# Patient Record
Sex: MALE | Race: BLACK OR AFRICAN AMERICAN | Employment: PART TIME | URBAN - METROPOLITAN AREA
[De-identification: names, ages, dates, MRNs, and addresses within clinical notes are randomized per-mention and may not be internally consistent; named-entity substitution may affect disease eponyms.]

---

## 2017-05-04 ENCOUNTER — ALLSCRIPTS OFFICE VISIT (OUTPATIENT)
Dept: OTHER | Facility: OTHER | Age: 22
End: 2017-05-04

## 2017-06-06 ENCOUNTER — ALLSCRIPTS OFFICE VISIT (OUTPATIENT)
Dept: OTHER | Facility: OTHER | Age: 22
End: 2017-06-06

## 2017-06-06 ENCOUNTER — TRANSCRIBE ORDERS (OUTPATIENT)
Dept: LAB | Facility: CLINIC | Age: 22
End: 2017-06-06

## 2017-06-06 ENCOUNTER — APPOINTMENT (OUTPATIENT)
Dept: LAB | Facility: CLINIC | Age: 22
End: 2017-06-06
Payer: COMMERCIAL

## 2017-06-06 DIAGNOSIS — R63.4 ABNORMAL WEIGHT LOSS: ICD-10-CM

## 2017-06-06 DIAGNOSIS — Z00.00 ENCOUNTER FOR GENERAL ADULT MEDICAL EXAMINATION WITHOUT ABNORMAL FINDINGS: ICD-10-CM

## 2017-06-06 LAB
ALBUMIN SERPL BCP-MCNC: 3.9 G/DL (ref 3.5–5)
ALP SERPL-CCNC: 87 U/L (ref 46–116)
ALT SERPL W P-5'-P-CCNC: 28 U/L (ref 12–78)
ANION GAP SERPL CALCULATED.3IONS-SCNC: 7 MMOL/L (ref 4–13)
AST SERPL W P-5'-P-CCNC: 25 U/L (ref 5–45)
BASOPHILS # BLD AUTO: 0.05 THOUSANDS/ΜL (ref 0–0.1)
BASOPHILS NFR BLD AUTO: 1 % (ref 0–1)
BILIRUB SERPL-MCNC: 0.67 MG/DL (ref 0.2–1)
BUN SERPL-MCNC: 12 MG/DL (ref 5–25)
CALCIUM SERPL-MCNC: 9.6 MG/DL (ref 8.3–10.1)
CHLORIDE SERPL-SCNC: 99 MMOL/L (ref 100–108)
CO2 SERPL-SCNC: 30 MMOL/L (ref 21–32)
CREAT SERPL-MCNC: 1.07 MG/DL (ref 0.6–1.3)
EOSINOPHIL # BLD AUTO: 2 THOUSAND/ΜL (ref 0–0.61)
EOSINOPHIL NFR BLD AUTO: 27 % (ref 0–6)
ERYTHROCYTE [DISTWIDTH] IN BLOOD BY AUTOMATED COUNT: 12.6 % (ref 11.6–15.1)
GFR SERPL CREATININE-BSD FRML MDRD: >60 ML/MIN/1.73SQ M
GLUCOSE P FAST SERPL-MCNC: 73 MG/DL (ref 65–99)
HCT VFR BLD AUTO: 46 % (ref 36.5–49.3)
HGB BLD-MCNC: 16.8 G/DL (ref 12–17)
LYMPHOCYTES # BLD AUTO: 2.35 THOUSANDS/ΜL (ref 0.6–4.47)
LYMPHOCYTES NFR BLD AUTO: 32 % (ref 14–44)
MCH RBC QN AUTO: 29.8 PG (ref 26.8–34.3)
MCHC RBC AUTO-ENTMCNC: 36.5 G/DL (ref 31.4–37.4)
MCV RBC AUTO: 82 FL (ref 82–98)
MONOCYTES # BLD AUTO: 0.4 THOUSAND/ΜL (ref 0.17–1.22)
MONOCYTES NFR BLD AUTO: 5 % (ref 4–12)
NEUTROPHILS # BLD AUTO: 2.6 THOUSANDS/ΜL (ref 1.85–7.62)
NEUTS SEG NFR BLD AUTO: 35 % (ref 43–75)
NRBC BLD AUTO-RTO: 0 /100 WBCS
PLATELET # BLD AUTO: 227 THOUSANDS/UL (ref 149–390)
PMV BLD AUTO: 10.1 FL (ref 8.9–12.7)
POTASSIUM SERPL-SCNC: 3.9 MMOL/L (ref 3.5–5.3)
PROT SERPL-MCNC: 8.1 G/DL (ref 6.4–8.2)
RBC # BLD AUTO: 5.64 MILLION/UL (ref 3.88–5.62)
SODIUM SERPL-SCNC: 136 MMOL/L (ref 136–145)
TSH SERPL DL<=0.05 MIU/L-ACNC: 0.83 UIU/ML (ref 0.36–3.74)
WBC # BLD AUTO: 7.42 THOUSAND/UL (ref 4.31–10.16)

## 2017-06-06 PROCEDURE — 85025 COMPLETE CBC W/AUTO DIFF WBC: CPT

## 2017-06-06 PROCEDURE — 36415 COLL VENOUS BLD VENIPUNCTURE: CPT

## 2017-06-06 PROCEDURE — 80053 COMPREHEN METABOLIC PANEL: CPT

## 2017-06-06 PROCEDURE — 84443 ASSAY THYROID STIM HORMONE: CPT

## 2017-06-07 ENCOUNTER — GENERIC CONVERSION - ENCOUNTER (OUTPATIENT)
Dept: OTHER | Facility: OTHER | Age: 22
End: 2017-06-07

## 2017-12-20 ENCOUNTER — ALLSCRIPTS OFFICE VISIT (OUTPATIENT)
Dept: OTHER | Facility: OTHER | Age: 22
End: 2017-12-20

## 2018-01-13 VITALS
SYSTOLIC BLOOD PRESSURE: 122 MMHG | BODY MASS INDEX: 22.33 KG/M2 | WEIGHT: 159.5 LBS | HEIGHT: 71 IN | HEART RATE: 60 BPM | DIASTOLIC BLOOD PRESSURE: 84 MMHG

## 2018-01-13 NOTE — PROGRESS NOTES
Assessment    1  Encounter for preventive health examination (V70 0) (Z00 00)   2  Weight loss (783 21) (R63 4)    Plan  Health Maintenance, Weight loss    · (1) CBC/PLT/DIFF; Status:Active; Requested LXH:[id number];    · (1) COMPREHENSIVE METABOLIC PANEL; Status:Active; Requested RSA:[id number];    · (1) TSH WITH FT4 REFLEX; Status:Active; Requested BAN:[id number];     Discussion/Summary  Impression: health maintenance visit, healthy adult male  Currently, he eats a poor diet  Prostate cancer screening: the risks and benefits of prostate cancer screening were discussed  Testicular cancer screening: the risks and benefits of testicular cancer screening were discussed and monthly self testicular exam was advised  Colorectal cancer screening: the risks and benefits of colorectal cancer screening were discussed and colorectal cancer screening is not indicated  Advice and education were given regarding nutrition, aerobic exercise, calcium supplements, vitamin D supplements and self skin examination  HM : form for the summer [employer] filled out   pt is Up to date on his immunization except for Menactra and Tdap which were given today  pt BP is low today because of the fasting state Advised to drink as much fluid as he can today to prevent any dehydration   pt to cont the malaria prophylaxis for 5 more days   Possible side effects of new medications were reviewed with the patient/guardian today  The treatment plan was reviewed with the patient/guardian  The patient/guardian understands and agrees with the treatment plan      Chief Complaint  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  ak      History of Present Illness  HM, Adult Male: The patient is being seen for a health maintenance evaluation  Social History: (adopted parent )  General Health: The patient's health since the last visit is described as good  Lifestyle:  He does not have a healthy diet  He has weight concerns   He exercises regularly  He does not use tobacco  He denies alcohol use  He denies drug use  Screening:   HPI: pt here for well visit   doing well overall   just returned from [place]   his fasting but couldn't make it today due to not be able to eat after he break his fasting   Review of Systems    Constitutional: No fever or chills, feels well, no tiredness, no recent weight gain or weight loss  Eyes: No complaints of eye pain, no red eyes, no discharge from eyes, no itchy eyes  ENT: no complaints of earache, no hearing loss, no nosebleeds, no nasal discharge, no sore throat, no hoarseness  Cardiovascular: No complaints of slow heart rate, no fast heart rate, no chest pain, no palpitations, no leg claudication, no lower extremity  Respiratory: No complaints of shortness of breath, no wheezing, no cough, no SOB on exertion, no orthopnea or PND  Gastrointestinal: No complaints of abdominal pain, no constipation, no nausea or vomiting, no diarrhea or bloody stools  Genitourinary: No complaints of dysuria, no incontinence, no hesitancy, no nocturia, no genital lesion, no testicular pain  Musculoskeletal: No complaints of arthralgia, no myalgias, no joint swelling or stiffness, no limb pain or swelling  Integumentary: No complaints of skin rash or skin lesions, no itching, no skin wound, no dry skin  Neurological: No compliants of headache, no confusion, no convulsions, no numbness or tingling, no dizziness or fainting, no limb weakness, no difficulty walking  Psychiatric: Is not suicidal, no sleep disturbances, no anxiety or depression, no change in personality, no emotional problems  Endocrine: No complaints of proptosis, no hot flashes, no muscle weakness, no erectile dysfunction, no deepening of the voice, no feelings of weakness  Hematologic/Lymphatic: No complaints of swollen glands, no swollen glands in the neck, does not bleed easily, no easy bruising  Active Problems    1   Counseling About Travel   2  Skin rash (782 1) (R21)    Social History    · Adopted child   · No alcohol use   · Non-smoker (V49 89) (Z78 9)    Current Meds   1  Atovaquone-Proguanil HCl - 250-100 MG Oral Tablet; TAKE 1 TABLET DAILY WITH   FOOD  BEGIN 1-2 DAYS BEFORE TRAVEL, CONTINUE DURING STAY, AND FOR 7   DAYS AFTER RETURN;   Therapy: 96LCE1090 to (Evaluate:13Jun2017)  Requested for: 07NGD4412; Last   OB:[id number] Ordered   2  Clotrimazole-Betamethasone 1-0 05 % External Cream; APPLY SPARINGLY TO THE   AFFECTED AREA(S) TWICE DAILY; Therapy: 78HCM6508 to (Last WL:[id number])  Requested for: 78DMD9914 Ordered   3  Excedrin Migraine TABS Recorded    Allergies    1  No Known Drug Allergies    Vitals   Recorded: 70GOQ8663 01:29PM   Heart Rate 56   Systolic 92   Diastolic 58   Height 5 ft 10 7 in   Weight 151 lb    BMI Calculated 21 24   BSA Calculated 1 87     Physical Exam    Constitutional   General appearance: No acute distress, well appearing and well nourished  Eyes   Conjunctiva and lids: No swelling, erythema, or discharge  Ears, Nose, Mouth, and Throat   External inspection of ears and nose: Normal     Otoscopic examination: Tympanic membrance translucent with normal light reflex  Canals patent without erythema  Pulmonary   Respiratory effort: No increased work of breathing or signs of respiratory distress  Auscultation of lungs: Clear to auscultation  Cardiovascular   Palpation of heart: Normal PMI, no thrills  Auscultation of heart: Normal rate and rhythm, normal S1 and S2, without murmurs  Abdomen   Abdomen: Non-tender, no masses  Liver and spleen: No hepatomegaly or splenomegaly  Lymphatic   Palpation of lymph nodes in neck: No lymphadenopathy  Musculoskeletal   Gait and station: Normal     Digits and nails: Normal without clubbing or cyanosis  Inspection/palpation of joints, bones, and muscles: Normal     Skin   Skin and subcutaneous tissue: Normal without rashes or lesions  Psychiatric   Orientation to person, place and time: Normal     Mood and affect: Normal        Results/Data  PHQ-2 Adult Depression Screening 06Jun2017 01:35PM User, Ahs     Test Name Result Flag Reference   PHQ-2 Adult Depression Score 0     Over the last two weeks, how often have you been bothered by any of the following problems?   Little interest or pleasure in doing things: Not at all - 0  Feeling down, depressed, or hopeless: Not at all - 0   PHQ-2 Adult Depression Screening Negative         Signatures   Electronically signed by : Sheng Azar MD; Jun 6 2017  1:59PM EST                       (Author)

## 2018-01-22 VITALS
BODY MASS INDEX: 21.14 KG/M2 | HEART RATE: 56 BPM | HEIGHT: 71 IN | WEIGHT: 151 LBS | SYSTOLIC BLOOD PRESSURE: 92 MMHG | DIASTOLIC BLOOD PRESSURE: 58 MMHG

## 2018-01-23 VITALS
DIASTOLIC BLOOD PRESSURE: 66 MMHG | SYSTOLIC BLOOD PRESSURE: 98 MMHG | WEIGHT: 157 LBS | BODY MASS INDEX: 21.98 KG/M2 | HEART RATE: 60 BPM | RESPIRATION RATE: 16 BRPM | HEIGHT: 71 IN

## 2018-01-23 NOTE — PROGRESS NOTES
Assessment    1  Encounter for preventive health examination (V70 0) (Z00 00)   2  's permit PE (physical examination) (V70 3) (Z02 4)    Plan  's permit PE (physical examination)    · Eat a normal well-balanced diet  Follow the food pyramid for healthy eating ;  Status:Complete;   Done: 03MOQ0507   · To prevent head injury, wear a helmet for any activity where you could be struck on the  head or fall on your head ; Status:Complete;   Done: 01YQC1129    Discussion/Summary  Impression: health maintenance visit, healthy adult male  Currently, he eats a poor diet  Prostate cancer screening: the risks and benefits of prostate cancer screening were discussed  Testicular cancer screening: the risks and benefits of testicular cancer screening were discussed and monthly self testicular exam was advised  Colorectal cancer screening: the risks and benefits of colorectal cancer screening were discussed and colorectal cancer screening is not indicated  Advice and education were given regarding nutrition, aerobic exercise, calcium supplements, vitamin D supplements and self skin examination  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  pt is Up to date on his immunization  Patient had health maintenance visit 6 months ago but since for lead physical examination with the last 6 months it was conducted today  Possible side effects of new medications were reviewed with the patient/guardian today  The treatment plan was reviewed with the patient/guardian  The patient/guardian understands and agrees with the treatment plan      Chief Complaint  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  [facility]      History of Present Illness  HM, Adult Male: The patient is being seen for a health maintenance evaluation  Social History: (adopted parent )  General Health: The patient's health since the last visit is described as good  Immunizations status: not up to date  Lifestyle:  He does not have a healthy diet   He has weight concerns  He exercises regularly  He does not use tobacco  He denies alcohol use  Screening:   HPI: pt here for well visit   doing well overall        Review of Systems    Constitutional: No fever or chills, feels well, no tiredness, no recent weight gain or weight loss  Eyes: No complaints of eye pain, no red eyes, no discharge from eyes, no itchy eyes  ENT: no complaints of earache, no hearing loss, no nosebleeds, no nasal discharge, no sore throat, no hoarseness  Cardiovascular: No complaints of slow heart rate, no fast heart rate, no chest pain, no palpitations, no leg claudication, no lower extremity  Respiratory: No complaints of shortness of breath, no wheezing, no cough, no SOB on exertion, no orthopnea or PND  Gastrointestinal: No complaints of abdominal pain, no constipation, no nausea or vomiting, no diarrhea or bloody stools  Genitourinary: No complaints of dysuria, no incontinence, no hesitancy, no nocturia, no genital lesion, no testicular pain  Musculoskeletal: No complaints of arthralgia, no myalgias, no joint swelling or stiffness, no limb pain or swelling  Integumentary: No complaints of skin rash or skin lesions, no itching, no skin wound, no dry skin  Neurological: No compliants of headache, no confusion, no convulsions, no numbness or tingling, no dizziness or fainting, no limb weakness, no difficulty walking  Psychiatric: Is not suicidal, no sleep disturbances, no anxiety or depression, no change in personality, no emotional problems  Endocrine: No complaints of proptosis, no hot flashes, no muscle weakness, no erectile dysfunction, no deepening of the voice, no feelings of weakness  Hematologic/Lymphatic: No complaints of swollen glands, no swollen glands in the neck, does not bleed easily, no easy bruising  Active Problems    1   Skin rash (782 1) (R21)    Past Medical History    · History of Counseling About Travel   · History of weight loss (V49 89) (Z78 9)   · History of Need for meningococcal vaccination (V03 89) (Z23)   · History of Need for tetanus booster (V03 7) (Z23)    Social History    · Adopted child   · No alcohol use   · Non-smoker (V49 89) (Z78 9)    Current Meds   1  Excedrin Migraine TABS Recorded    Allergies    1  No Known Drug Allergies    Vitals   Recorded: 77Hdc6013 01:19PM   Heart Rate 60, L Radial   Pulse Quality Normal, L Radial   Respiration 16   Systolic 98, LUE, Sitting   Diastolic 66, LUE, Sitting   BP CUFF SIZE Standard   Height 5 ft 11 in   Weight 157 lb    BMI Calculated 21 9   BSA Calculated 1 9     Physical Exam    Constitutional   General appearance: No acute distress, well appearing and well nourished  Eyes   Conjunctiva and lids: No swelling, erythema, or discharge  Ears, Nose, Mouth, and Throat   External inspection of ears and nose: Normal     Otoscopic examination: Tympanic membrance translucent with normal light reflex  Canals patent without erythema  Pulmonary   Respiratory effort: No increased work of breathing or signs of respiratory distress  Auscultation of lungs: Clear to auscultation  Cardiovascular   Palpation of heart: Normal PMI, no thrills  Auscultation of heart: Normal rate and rhythm, normal S1 and S2, without murmurs  Abdomen   Abdomen: Non-tender, no masses  Liver and spleen: No hepatomegaly or splenomegaly  Lymphatic   Palpation of lymph nodes in neck: No lymphadenopathy  Musculoskeletal   Gait and station: Normal     Digits and nails: Normal without clubbing or cyanosis  Inspection/palpation of joints, bones, and muscles: Normal     Skin   Skin and subcutaneous tissue: Normal without rashes or lesions      Psychiatric   Orientation to person, place and time: Normal     Mood and affect: Normal        Procedure    Procedure:   Results: 20/20 in both eyes without corrective device, 20/20 in the right eye without corrective device, 20/20 in the left eye without corrective device normal in both eyes     Color vision was screened with the [facility] Test and the results were normal       Signatures   Electronically signed by : Ángela Humphries MD; Dec 22 2017 11:04AM EST                       (Author)

## 2018-05-15 ENCOUNTER — OFFICE VISIT (OUTPATIENT)
Dept: FAMILY MEDICINE CLINIC | Facility: CLINIC | Age: 23
End: 2018-05-15
Payer: COMMERCIAL

## 2018-05-15 VITALS
SYSTOLIC BLOOD PRESSURE: 110 MMHG | HEIGHT: 71 IN | HEART RATE: 64 BPM | BODY MASS INDEX: 23.38 KG/M2 | WEIGHT: 167 LBS | DIASTOLIC BLOOD PRESSURE: 82 MMHG

## 2018-05-15 DIAGNOSIS — Z02.89 ENCOUNTER FOR COMPLETION OF FORM WITH PATIENT: Primary | ICD-10-CM

## 2018-05-15 PROBLEM — R63.4 WEIGHT LOSS: Status: ACTIVE | Noted: 2017-06-06

## 2018-05-15 PROBLEM — R21 SKIN RASH: Status: ACTIVE | Noted: 2017-05-04

## 2018-05-15 PROCEDURE — 99213 OFFICE O/P EST LOW 20 MIN: CPT | Performed by: FAMILY MEDICINE

## 2018-05-15 PROCEDURE — 3008F BODY MASS INDEX DOCD: CPT | Performed by: FAMILY MEDICINE

## 2018-05-15 RX ORDER — ACETAMINOPHEN, ASPIRIN AND CAFFEINE 250; 250; 65 MG/1; MG/1; MG/1
TABLET, FILM COATED ORAL
COMMUNITY
End: 2020-06-30 | Stop reason: ALTCHOICE

## 2018-05-15 NOTE — ASSESSMENT & PLAN NOTE
Completely resolved, patient have weight gain now  Patient can start his fasting months, just to keep up with the hydration

## 2018-05-15 NOTE — PROGRESS NOTES
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, immunization are all up-to-date, the form was filled out  Patient had a CBC done in June 2017, and it is required to be done within the last year, no indication to do urine analysis for this patient  Weight loss  Completely resolved, patient have weight gain now  Patient can start his fasting months, just to keep up with the hydration  Skin rash  Completely resolved  Diagnoses and all orders for this visit:    Encounter for completion of form with patient    Other orders  -     aspirin-acetaminophen-caffeine ([place] [employer]) 250-250-65 MG per tablet; Take by mouth          Subjective: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      Patient ID: Ariadne Gallegos is a 23 y o  male  Patient is here for follow-up on his skin lesions, is completely resolved, also for weight loss, that is much improved, patient gained 10 lb since last visit, denied any other symptoms, patient will start fasting tomorrow, and he feel he is prepared for it, 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,        The following portions of the patient's history were reviewed and updated as appropriate: allergies, current medications, past family history, past medical history, past social history, past surgical history and problem list     Review of Systems   Constitutional: Negative for appetite change, chills and fever  HENT: Negative for congestion, sore throat and voice change  Eyes: Negative for pain and visual disturbance  Respiratory: Negative for cough  Cardiovascular: Negative for chest pain and palpitations  Gastrointestinal: Negative for abdominal pain, constipation, diarrhea and nausea  Endocrine: Negative for cold intolerance, heat intolerance and polyuria  Genitourinary: Negative for dysuria, enuresis, flank pain and frequency     Musculoskeletal: Negative for gait problem, joint swelling and neck pain  Skin: Negative for color change and wound  Neurological: Negative for dizziness, syncope, speech difficulty, numbness and headaches  Psychiatric/Behavioral: Negative for behavioral problems and confusion  The patient is not nervous/anxious  Objective:      /82   Pulse 64   Ht 5' 11" (1 803 m)   Wt 75 8 kg (167 lb)   BMI 23 29 kg/m²          Physical Exam   Constitutional: He is oriented to person, place, and time  He appears well-developed and well-nourished  HENT:   Head: Normocephalic and atraumatic  Eyes: Conjunctivae and EOM are normal  Pupils are equal, round, and reactive to light  Neck: Normal range of motion  Neck supple  Cardiovascular: Normal rate, regular rhythm, normal heart sounds and intact distal pulses  Pulmonary/Chest: Effort normal and breath sounds normal    Abdominal: Soft  Bowel sounds are normal    Musculoskeletal: Normal range of motion  Neurological: He is alert and oriented to person, place, and time  He has normal reflexes  Skin: Skin is warm and dry  Psychiatric: He has a normal mood and affect  His behavior is normal    Vitals reviewed

## 2018-05-15 NOTE — ASSESSMENT & PLAN NOTE
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

## 2018-05-15 NOTE — PATIENT INSTRUCTIONS
Thank you for enrolling in [facility]  Please follow the instructions below to securely access your online medical record  [employer] allows you to send messages to your doctor, view your test results, renew your prescriptions, schedule appointments, and more  [facility] uses Single Sign on (SSO) Technology for you to log in and access our [facility] SPECIAL[facility], including [employer]  No more remembering multiple user names and passwords! We are going to guide you through, step by step, to help you set up your [facility] account which will provide access to your [employer] account  How Do I Sign Up? 1  In your Internet browser, go to [email or web address]       2  Click on the [facility] patient account and then click Dont have an                 Account? Create one now      3  Enter your demographic information and chose a user name (email address) and password  Think of one that is secure and easy to remember  Enter a Referral code if you have one (this is not your [employer] code ) Accept the Terms and Conditions and the Privacy Policy  4  Select your security questions that you will use to reset your password should you forget it  Click Submit  5  Enter your [employer] Activation Code exactly as it appears below  You will not need to use this code after you have completed the sign-up process  If you do not sign up before the expiration date, you must request a new code  [employer] Activation Code: [email or web address]  Expires: 5/18/2018 11:39 AM    6  Confirm your email address  An email confirmation was sent to you  Please open that email and click Confirm your Email   You should then be redirected to our [employer] Single sign on page, where you will log on with the user name and password you created! Proceed to the [employer] Icon to view your personal health information          Additional Information  If you have questions, you can e-mail patient  [email or web address] or call [phone] to talk to our customer support staff  Remember, MyChart is NOT to be used for urgent needs  For medical emergencies, dial 911

## 2018-10-29 ENCOUNTER — TELEPHONE (OUTPATIENT)
Dept: FAMILY MEDICINE CLINIC | Facility: CLINIC | Age: 23
End: 2018-10-29

## 2018-10-29 NOTE — TELEPHONE ENCOUNTER
Left atrial enlargement ? Is that done through EKG or what ? Can we have the records   We can order Cardiology referral but need documentation of the diagnosis before we put the referral on

## 2018-10-29 NOTE — TELEPHONE ENCOUNTER
SON HASN'T BEEN FEELING WELL  JUST DIAGNOSED IN URGENT CARE WITH   LEFT ATRIAL ENLARGEMENT  MOTHER WOULD LIKE A REFERRAL TO CARDIOLOGIST PLEASE  PLEASE ADVISE MOTHER  THANK YOU

## 2018-11-13 ENCOUNTER — OFFICE VISIT (OUTPATIENT)
Dept: FAMILY MEDICINE CLINIC | Facility: CLINIC | Age: 23
End: 2018-11-13
Payer: COMMERCIAL

## 2018-11-13 VITALS
WEIGHT: 159 LBS | SYSTOLIC BLOOD PRESSURE: 116 MMHG | BODY MASS INDEX: 22.18 KG/M2 | DIASTOLIC BLOOD PRESSURE: 80 MMHG | HEART RATE: 68 BPM

## 2018-11-13 DIAGNOSIS — R51.9 CHRONIC NONINTRACTABLE HEADACHE, UNSPECIFIED HEADACHE TYPE: ICD-10-CM

## 2018-11-13 DIAGNOSIS — G89.29 CHRONIC NONINTRACTABLE HEADACHE, UNSPECIFIED HEADACHE TYPE: ICD-10-CM

## 2018-11-13 DIAGNOSIS — R94.31 ABNORMAL EKG: Primary | ICD-10-CM

## 2018-11-13 DIAGNOSIS — R42 DIZZINESS: ICD-10-CM

## 2018-11-13 PROCEDURE — 93000 ELECTROCARDIOGRAM COMPLETE: CPT | Performed by: FAMILY MEDICINE

## 2018-11-13 PROCEDURE — 99213 OFFICE O/P EST LOW 20 MIN: CPT | Performed by: FAMILY MEDICINE

## 2018-11-13 NOTE — PROGRESS NOTES
Assessment/Plan:    Abnormal EKG  PATIENT WAS SEEN AT THE  AND CARE FOR DIZZINESS, HIS EKG SHOWED LEFT ATRIAL ENLARGEMENT, PATIENT WAS CONCERNED ABOUT THAT, PATIENT DENIED ANY CARDIAC SYMPTOMS, HIS DIZZINESS RESOLVED  REPEAT EKG TODAY SHOWED NORMAL SINUS RHYTHM WITH NO EVIDENCE OF HYPERTROPHY, PATIENT WAS REFERRED TO THE CARDIOLOGY FOR AN EVALUATION AND ECHOCARDIOGRAM   NO FAMILY HISTORY OF HYPERTROPHIC CARDIOMYOPATHY  Dizziness  COMPLETELY RESOLVED, ADVISED PATIENT ON WELL HYDRATION, PATIENT SEEM TO BE DRINKING A LOT OF FLUID ADVISED ON MODERATE HYDRATION TO 3 LITERS VERY DAY, ADVISED ON SMALL FREQUENT MEALS THROUGH THE DAY  Diagnoses and all orders for this visit:    Abnormal EKG  -     Ambulatory referral to Cardiology; Future    Chronic nonintractable headache, unspecified headache type    Dizziness  -     POCT ECG  -     Ambulatory referral to Cardiology; Future          Subjective: patient states he had an episode of nausea, dizziness, sweaty about 2 weeks ago and went to [facility]  An EKG was done and showed Left Atrial Enlargement and was told to f/u with his PCP  Patient took his BP at home in mid October with readings of 152/100, 149/76, 128/79  No symptoms since then  ak     Patient ID: Nuzhat Francisco is a 20 y o  male      Patient was complaining of headache and feeling dizzy and not feeling himself, he measure his blood pressure his blood pressure was 150/100, he called his mom who advised him to go to a near urgent care for evaluation, blood work was done his creatinine was elevated, EKG was done showed left atrial enlargement, patient was advised to follow up with the cardiologist   Patient is here today for an evaluation of this, he denied any chest pain shortness of breath or dyspnea on exertion, he have a copy of the EKG that was done at the urgent care which showed left atrial enlargement nonspecific T-wave abnormality sinus rhythm  Patient is adopted, but he is fully aware of his family history, his mom  at very young age from yellow fever, his aunt  at young age but he the know what is the reason exactly he denied any family history of hypertrophic cardiomyopathy or sudden death at young age  Denied any lower limb swelling fatigue or any other symptoms  The following portions of the patient's history were reviewed and updated as appropriate: allergies, current medications, past family history, past medical history, past social history, past surgical history and problem list     Review of Systems   Constitutional: Negative for activity change, appetite change, chills, diaphoresis, fatigue and fever  HENT: Negative for congestion, postnasal drip, sinus pressure, sore throat and voice change  Eyes: Negative for pain, redness and visual disturbance  Respiratory: Negative for cough, chest tightness, shortness of breath and wheezing  Cardiovascular: Negative for chest pain, palpitations and leg swelling  Gastrointestinal: Negative for abdominal pain, constipation, diarrhea and nausea  Endocrine: Negative for cold intolerance, heat intolerance and polyuria  Genitourinary: Negative for dysuria, enuresis, flank pain and frequency  Musculoskeletal: Negative for gait problem, joint swelling and neck pain  Skin: Negative for color change and wound  Neurological: Negative for dizziness, syncope, speech difficulty, numbness and headaches  Psychiatric/Behavioral: Negative for behavioral problems and confusion  The patient is not nervous/anxious  Objective:      /80   Pulse 68   Wt 72 1 kg (159 lb)   BMI 22 18 kg/m²          Physical Exam   Constitutional: He is oriented to person, place, and time  He appears well-developed and well-nourished  HENT:   Head: Normocephalic and atraumatic  Eyes: Pupils are equal, round, and reactive to light  Conjunctivae and EOM are normal    Neck: Normal range of motion  Neck supple     Cardiovascular: Normal rate, regular rhythm, normal heart sounds and intact distal pulses  Pulmonary/Chest: Effort normal and breath sounds normal    Abdominal: Soft  Bowel sounds are normal    Musculoskeletal: Normal range of motion  Neurological: He is alert and oriented to person, place, and time  He has normal reflexes  Skin: Skin is warm and dry  Psychiatric: He has a normal mood and affect  His behavior is normal    Nursing note and vitals reviewed             POCT ECG     Date/Time 11/13/2018 6:42 PM     Performed by  Kimo Sheets by Shannan Carvalho      Procedure Details   Procedure Notes: EKG NORMAL SINUS RHYTHM

## 2018-11-13 NOTE — ASSESSMENT & PLAN NOTE
COMPLETELY RESOLVED, ADVISED PATIENT ON WELL HYDRATION, PATIENT SEEM TO BE DRINKING A LOT OF FLUID ADVISED ON MODERATE HYDRATION TO 3 LITERS VERY DAY, ADVISED ON SMALL FREQUENT MEALS THROUGH THE DAY

## 2018-11-13 NOTE — PATIENT INSTRUCTIONS

## 2018-11-13 NOTE — ASSESSMENT & PLAN NOTE
PATIENT WAS SEEN AT THE  AND CARE FOR DIZZINESS, HIS EKG SHOWED LEFT ATRIAL ENLARGEMENT, PATIENT WAS CONCERNED ABOUT THAT, PATIENT DENIED ANY CARDIAC SYMPTOMS, HIS DIZZINESS RESOLVED  REPEAT EKG TODAY SHOWED NORMAL SINUS RHYTHM WITH NO EVIDENCE OF HYPERTROPHY, PATIENT WAS REFERRED TO THE CARDIOLOGY FOR AN EVALUATION AND ECHOCARDIOGRAM   NO FAMILY HISTORY OF HYPERTROPHIC CARDIOMYOPATHY

## 2018-12-27 ENCOUNTER — TELEPHONE (OUTPATIENT)
Dept: FAMILY MEDICINE CLINIC | Facility: CLINIC | Age: 23
End: 2018-12-27

## 2018-12-27 NOTE — TELEPHONE ENCOUNTER
PT IS GOING TO [facility] AND IS LEAVING Sunday NIGHT FOR A MONTH AND WILL NEED ANTI MALARIAL MEDICATION PLEASE  PT SEES DR VERDUGO  [facility]  PLEASE ADVISE ASAP  THANK YOU

## 2018-12-28 DIAGNOSIS — Z29.8 NEED FOR MALARIA PROPHYLAXIS: Primary | ICD-10-CM

## 2018-12-28 RX ORDER — ATOVAQUONE AND PROGUANIL HYDROCHLORIDE 250; 100 MG/1; MG/1
1 TABLET, FILM COATED ORAL
Qty: 45 TABLET | Refills: 0 | Status: SHIPPED | OUTPATIENT
Start: 2018-12-28 | End: 2019-03-13

## 2019-03-13 ENCOUNTER — OFFICE VISIT (OUTPATIENT)
Dept: FAMILY MEDICINE CLINIC | Facility: CLINIC | Age: 24
End: 2019-03-13
Payer: COMMERCIAL

## 2019-03-13 VITALS
WEIGHT: 161 LBS | SYSTOLIC BLOOD PRESSURE: 108 MMHG | DIASTOLIC BLOOD PRESSURE: 72 MMHG | HEART RATE: 80 BPM | HEIGHT: 71 IN | BODY MASS INDEX: 22.54 KG/M2 | RESPIRATION RATE: 20 BRPM

## 2019-03-13 DIAGNOSIS — Z00.00 ENCOUNTER FOR HEALTH MAINTENANCE EXAMINATION: Primary | ICD-10-CM

## 2019-03-13 DIAGNOSIS — Z23 NEED FOR TUBERCULOSIS VACCINATION: ICD-10-CM

## 2019-03-13 PROCEDURE — 99395 PREV VISIT EST AGE 18-39: CPT | Performed by: FAMILY MEDICINE

## 2019-03-13 PROCEDURE — 86580 TB INTRADERMAL TEST: CPT

## 2019-03-13 NOTE — PROGRESS NOTES
Assessment and Plan:    Encounter for health maintenance examination  Appears to be in good health  Form for  was filled out, PPD will be placed today, patient with history of BCG vaccination  Form for  license was filled out  Anticipatory guidance was discussed with the patient  Diagnoses and all orders for this visit:    Encounter for health maintenance examination    Need for tuberculosis vaccination  -     TB Skin Test              Subjective:      Patient ID: Karla Bennett is a 20 y o  male  CC:    Chief Complaint   Patient presents with    Physical Exam     pt has three forms to be filled out  R Michael       HPI:    Well Adult Physical   Patient here for a comprehensive physical exam The patient reports no problems    Do you take any herbs or supplements that were not prescribed by a doctor? no   Are you taking calcium supplements? no   Are you taking aspirin daily? no     History:  Patient receives prostate care outside our clinic  Pt starting the Job at  in addition to his job at the summer ,also he would like  license form to be filled out  The following portions of the patient's history were reviewed and updated as appropriate: allergies, current medications, past family history, past medical history, past social history, past surgical history and problem list       Review of Systems   Constitutional: Negative for activity change, appetite change, chills, diaphoresis, fatigue and fever  HENT: Negative for congestion, postnasal drip, sinus pressure, sore throat and voice change  Eyes: Negative for pain, redness and visual disturbance  Respiratory: Negative for cough, chest tightness, shortness of breath and wheezing  Cardiovascular: Negative for chest pain, palpitations and leg swelling  Gastrointestinal: Negative for abdominal pain, constipation, diarrhea and nausea  Endocrine: Negative for cold intolerance, heat intolerance and polyuria  Genitourinary: Negative for dysuria, enuresis, flank pain and frequency  Musculoskeletal: Negative for gait problem, joint swelling and neck pain  Skin: Negative for color change and wound  Neurological: Negative for dizziness, syncope, speech difficulty, numbness and headaches  Psychiatric/Behavioral: Negative for behavioral problems and confusion  The patient is not nervous/anxious  Data to review:       Objective:    Vitals:    03/13/19 0907   BP: 108/72   BP Location: Left arm   Patient Position: Sitting   Cuff Size: Standard   Pulse: 80   Resp: 20   Weight: 73 kg (161 lb)   Height: 5' 11" (1 803 m)        Visual Acuity Screening    Right eye Left eye Both eyes   Without correction: 20/20 20/20 20/20   With correction:           Physical Exam   Constitutional: He is oriented to person, place, and time  He appears well-developed and well-nourished  HENT:   Head: Normocephalic and atraumatic  Eyes: Pupils are equal, round, and reactive to light  Conjunctivae and EOM are normal    Neck: Normal range of motion  Neck supple  Cardiovascular: Normal rate, regular rhythm, normal heart sounds and intact distal pulses  Pulmonary/Chest: Effort normal and breath sounds normal    Abdominal: Soft  Bowel sounds are normal    Musculoskeletal: Normal range of motion  Neurological: He is alert and oriented to person, place, and time  He has normal reflexes  Skin: Skin is warm and dry  Psychiatric: He has a normal mood and affect  His behavior is normal    Nursing note and vitals reviewed

## 2019-03-14 PROBLEM — R42 DIZZINESS: Status: RESOLVED | Noted: 2018-11-13 | Resolved: 2019-03-14

## 2019-03-14 PROBLEM — R21 SKIN RASH: Status: RESOLVED | Noted: 2017-05-04 | Resolved: 2019-03-14

## 2019-03-14 PROBLEM — Z00.00 ENCOUNTER FOR HEALTH MAINTENANCE EXAMINATION: Status: ACTIVE | Noted: 2019-03-14

## 2019-03-14 PROBLEM — R63.4 WEIGHT LOSS: Status: RESOLVED | Noted: 2017-06-06 | Resolved: 2019-03-14

## 2019-03-14 NOTE — ASSESSMENT & PLAN NOTE
Appears to be in good health  Form for  was filled out, PPD will be placed today, patient with history of BCG vaccination  Form for  license was filled out  Anticipatory guidance was discussed with the patient

## 2019-03-15 ENCOUNTER — CLINICAL SUPPORT (OUTPATIENT)
Dept: FAMILY MEDICINE CLINIC | Facility: CLINIC | Age: 24
End: 2019-03-15

## 2019-03-15 ENCOUNTER — APPOINTMENT (OUTPATIENT)
Dept: RADIOLOGY | Facility: MEDICAL CENTER | Age: 24
End: 2019-03-15
Payer: COMMERCIAL

## 2019-03-15 DIAGNOSIS — Z11.1 ENCOUNTER FOR PPD SKIN TEST READING: Primary | ICD-10-CM

## 2019-03-15 DIAGNOSIS — R76.11 POSITIVE PPD: Primary | ICD-10-CM

## 2019-03-15 DIAGNOSIS — R76.11 POSITIVE PPD: ICD-10-CM

## 2019-03-15 LAB
INDURATION: NORMAL MM
TB SKIN TEST: POSITIVE

## 2019-03-15 PROCEDURE — 71046 X-RAY EXAM CHEST 2 VIEWS: CPT

## 2019-03-15 NOTE — PROGRESS NOTES
PPD read as positive 12mm induration  Per DM, pt will get CXR and results will be faxed to his employer at [phone]

## 2019-11-07 ENCOUNTER — OFFICE VISIT (OUTPATIENT)
Dept: FAMILY MEDICINE CLINIC | Facility: CLINIC | Age: 24
End: 2019-11-07
Payer: COMMERCIAL

## 2019-11-07 VITALS
RESPIRATION RATE: 18 BRPM | HEIGHT: 71 IN | DIASTOLIC BLOOD PRESSURE: 86 MMHG | TEMPERATURE: 98 F | HEART RATE: 68 BPM | SYSTOLIC BLOOD PRESSURE: 118 MMHG | BODY MASS INDEX: 24.08 KG/M2 | WEIGHT: 172 LBS

## 2019-11-07 DIAGNOSIS — N52.8 OTHER MALE ERECTILE DYSFUNCTION: Primary | ICD-10-CM

## 2019-11-07 DIAGNOSIS — Z29.8 NEED FOR MALARIA PROPHYLAXIS: ICD-10-CM

## 2019-11-07 PROCEDURE — 3008F BODY MASS INDEX DOCD: CPT | Performed by: PHYSICIAN ASSISTANT

## 2019-11-07 PROCEDURE — 99214 OFFICE O/P EST MOD 30 MIN: CPT | Performed by: PHYSICIAN ASSISTANT

## 2019-11-07 RX ORDER — ATOVAQUONE AND PROGUANIL HYDROCHLORIDE 250; 100 MG/1; MG/1
1 TABLET, FILM COATED ORAL
Qty: 45 TABLET | Refills: 0 | Status: CANCELLED | OUTPATIENT
Start: 2019-11-07 | End: 2019-12-22

## 2019-11-07 RX ORDER — SILDENAFIL 25 MG/1
25 TABLET, FILM COATED ORAL DAILY PRN
Qty: 10 TABLET | Refills: 0 | Status: SHIPPED | OUTPATIENT
Start: 2019-11-07 | End: 2020-02-03 | Stop reason: SDUPTHER

## 2019-11-07 RX ORDER — MEFLOQUINE HYDROCHLORIDE 250 MG/1
250 TABLET ORAL
Qty: 10 TABLET | Refills: 0 | Status: SHIPPED | OUTPATIENT
Start: 2019-11-07 | End: 2020-06-30 | Stop reason: ALTCHOICE

## 2019-11-07 NOTE — PROGRESS NOTES
Assessment and Plan:    Problem List Items Addressed This Visit        Other    Need for malaria prophylaxis     Pt was given a Rx for larium generic which is for chloroquine resistant areas including [facility] where he is going  To take once weekly starting 2 weeks prior to travel, during and 4 weeks after travel  Pt understands  Relevant Medications    mefloquine (LARIAM) 250 MG tablet    Other male erectile dysfunction - Primary     Likely psychogenic  Trial viagra  Relevant Medications    sildenafil (VIAGRA) 25 MG tablet                 Diagnoses and all orders for this visit:    Other male erectile dysfunction  -     sildenafil (VIAGRA) 25 MG tablet; Take 1 tablet (25 mg total) by mouth daily as needed for erectile dysfunction    Need for malaria prophylaxis  -     mefloquine (LARIAM) 250 MG tablet; Take 1 tablet (250 mg total) by mouth every 7 days for 10 days    Other orders  -     Cancel: atovaquone-proguanil (MALARONE) 250-100 mg; Take 1 tablet by mouth daily with breakfast              Subjective:      Patient ID: Blanca Herron is a 30 y o  male  CC:    Chief Complaint   Patient presents with    Follow-up     Patient present today for a refill on his Malarone med  Per pt he is traveling to [facility] on 12/7/2019  HPI:     Patient here today because he is traveling to [place] and would like a refill prescription on his anti malaria medication  He has been on many different kinds as he goes every year to visit family for at least a month during this time of the year  He would like the once weekly medication side of the once daily medication as he tends to forget every day  He is leaving on the 7th of December and will be staying there for 3 weeks  Also he has been having problems with erectile dysfunction since he has been in college however now he is out of college and no longer can blame the stress of going to school on his non performance    He states that he is able to get a full erection but it lasts very very briefly and he is not able to have intercourse with his girlfriend  He is wondering if he can try medication  Blood work was done which was essentially normal patient has no diabetes or blood pressure issues  The following portions of the patient's history were reviewed and updated as appropriate: allergies, current medications, past family history, past medical history, past social history, past surgical history and problem list       Review of Systems   Constitutional: Negative  HENT: Negative  Eyes: Negative  Respiratory: Negative  Cardiovascular: Negative  Gastrointestinal: Negative  Endocrine: Negative  Genitourinary:          Erectile dysfunction   Musculoskeletal: Negative  Skin: Negative  Allergic/Immunologic: Negative  Neurological: Negative  Hematological: Negative  Psychiatric/Behavioral: Negative  Data to review:       Objective:    Vitals:    11/07/19 1055   BP: 118/86   BP Location: Left arm   Patient Position: Sitting   Cuff Size: Standard   Pulse: 68   Resp: 18   Temp: 98 °F (36 7 °C)   TempSrc: Temporal   Weight: 78 kg (172 lb)   Height: 5' 11" (1 803 m)        Physical Exam   Constitutional: He is oriented to person, place, and time  He appears well-developed and well-nourished  No distress  HENT:   Head: Normocephalic and atraumatic  Eyes: Conjunctivae are normal  Right eye exhibits no discharge  Left eye exhibits no discharge  Neck: Carotid bruit is not present  Cardiovascular: Normal rate, regular rhythm and normal heart sounds  Exam reveals no gallop and no friction rub  No murmur heard  Pulmonary/Chest: Effort normal and breath sounds normal  No respiratory distress  He has no wheezes  He has no rales  Neurological: He is alert and oriented to person, place, and time  Skin: Skin is warm and dry  He is not diaphoretic  Psychiatric: He has a normal mood and affect   Judgment normal  Nursing note and vitals reviewed

## 2019-11-07 NOTE — PATIENT INSTRUCTIONS
Problem List Items Addressed This Visit        Other    Need for malaria prophylaxis     Pt was given a Rx for larium generic which is for chloroquine resistant areas including [facility] where he is going  To take once weekly starting 2 weeks prior to travel, during and 4 weeks after travel  Pt understands  Relevant Medications    mefloquine (LARIAM) 250 MG tablet    Other male erectile dysfunction - Primary     Likely psychogenic  Trial viagra            Relevant Medications    sildenafil (VIAGRA) 25 MG tablet

## 2019-11-07 NOTE — ASSESSMENT & PLAN NOTE
Pt was given a Rx for larium generic which is for chloroquine resistant areas including [facility] where he is going  To take once weekly starting 2 weeks prior to travel, during and 4 weeks after travel  Pt understands

## 2020-01-13 ENCOUNTER — OFFICE VISIT (OUTPATIENT)
Dept: FAMILY MEDICINE CLINIC | Facility: CLINIC | Age: 25
End: 2020-01-13
Payer: COMMERCIAL

## 2020-01-13 VITALS
WEIGHT: 170.4 LBS | BODY MASS INDEX: 23.85 KG/M2 | TEMPERATURE: 97.6 F | RESPIRATION RATE: 16 BRPM | DIASTOLIC BLOOD PRESSURE: 74 MMHG | HEIGHT: 71 IN | SYSTOLIC BLOOD PRESSURE: 120 MMHG | HEART RATE: 74 BPM

## 2020-01-13 DIAGNOSIS — R74.8 ELEVATED SERUM GGT LEVEL: ICD-10-CM

## 2020-01-13 DIAGNOSIS — R74.8 ELEVATED ALKALINE PHOSPHATASE LEVEL: Primary | ICD-10-CM

## 2020-01-13 PROCEDURE — 1036F TOBACCO NON-USER: CPT | Performed by: PHYSICIAN ASSISTANT

## 2020-01-13 PROCEDURE — 3008F BODY MASS INDEX DOCD: CPT | Performed by: PHYSICIAN ASSISTANT

## 2020-01-13 PROCEDURE — 99214 OFFICE O/P EST MOD 30 MIN: CPT | Performed by: PHYSICIAN ASSISTANT

## 2020-01-13 NOTE — PATIENT INSTRUCTIONS
Problem List Items Addressed This Visit        Other    Elevated alkaline phosphatase level - Primary      Recent fasting blood work showed an elevated alk-phos at 158  Will repeat with alk-phos isoenzymes  GGT also elevated so if repeat continues to be elevated will check ultrasound liver  Pt was however taking his antimalaria medicine for his trip out of the country during lab testing and just finished it so I will have him wait about 2 weeks to get next las done  Relevant Orders    Chronic Hepatitis Panel    Hepatic function panel    Alkaline phosphatase, isoenzymes    CBC and differential    Elevated serum GGT level      Recheck hepatic function with hepatitis panel           Relevant Orders    Chronic Hepatitis Panel    Hepatic function panel    Alkaline phosphatase, isoenzymes    CBC and differential

## 2020-01-13 NOTE — PROGRESS NOTES
Assessment and Plan:    Problem List Items Addressed This Visit        Other    Elevated alkaline phosphatase level - Primary      Recent fasting blood work showed an elevated alk-phos at 158  Will repeat with alk-phos isoenzymes  GGT also elevated so if repeat continues to be elevated will check ultrasound liver  Pt was however taking his antimalaria medicine for his trip out of the country during lab testing and just finished it so I will have him wait about 2 weeks to get next las done  Relevant Orders    Chronic Hepatitis Panel    Hepatic function panel    Alkaline phosphatase, isoenzymes    CBC and differential    Elevated serum GGT level      Recheck hepatic function with hepatitis panel  Relevant Orders    Chronic Hepatitis Panel    Hepatic function panel    Alkaline phosphatase, isoenzymes    CBC and differential                 Diagnoses and all orders for this visit:    Elevated alkaline phosphatase level  -     Chronic Hepatitis Panel; Future  -     Hepatic function panel; Future  -     Alkaline phosphatase, isoenzymes; Future  -     CBC and differential    Elevated serum GGT level  -     Chronic Hepatitis Panel; Future  -     Hepatic function panel; Future  -     Alkaline phosphatase, isoenzymes; Future  -     CBC and differential              Subjective:      Patient ID: Celeste Ma is a 30 y o  male  CC:    No chief complaint on file  HPI:     Patient here today to review blood work results  That were done through life insurance  It shows a normal fasting glucose his alk-phos is elevated at 158 as well as 1 of his liver function tests gamma glutamyltransferase which was 293 normally 2-65 cholesterol was done triglycerides 128 LDL 94 urinalysis normal  Pt does not drink alcohol and does not use tylenol often  He was taking his antimalaria meds when testing was performed and just stopped this recently         The following portions of the patient's history were reviewed and updated as appropriate: allergies, current medications, past family history, past medical history, past social history, past surgical history and problem list       Review of Systems   Constitutional: Negative  HENT: Negative  Eyes: Negative  Respiratory: Negative  Cardiovascular: Negative  Gastrointestinal: Negative  Endocrine: Negative  Genitourinary: Negative  Musculoskeletal: Negative  Skin: Negative  Allergic/Immunologic: Negative  Neurological: Negative  Hematological: Negative  Psychiatric/Behavioral: Negative  Data to review:       Objective:    Vitals:    01/13/20 1049   BP: 120/74   BP Location: Left arm   Patient Position: Sitting   Cuff Size: Adult   Pulse: 74   Resp: 16   Temp: 97 6 °F (36 4 °C)   TempSrc: Tympanic   Weight: 77 3 kg (170 lb 6 4 oz)   Height: 5' 11" (1 803 m)        Physical Exam   Constitutional: He is oriented to person, place, and time  He appears well-developed and well-nourished  No distress  HENT:   Head: Normocephalic and atraumatic  Eyes: Conjunctivae are normal  Right eye exhibits no discharge  Left eye exhibits no discharge  Neck: Carotid bruit is not present  Cardiovascular: Normal rate, regular rhythm and normal heart sounds  Exam reveals no gallop and no friction rub  No murmur heard  Pulmonary/Chest: Effort normal and breath sounds normal  No respiratory distress  He has no wheezes  He has no rales  Neurological: He is alert and oriented to person, place, and time  Skin: Skin is warm and dry  He is not diaphoretic  Psychiatric: He has a normal mood and affect  Judgment normal    Nursing note and vitals reviewed

## 2020-01-13 NOTE — ASSESSMENT & PLAN NOTE
Recent fasting blood work showed an elevated alk-phos at 158  Will repeat with alk-phos isoenzymes  GGT also elevated so if repeat continues to be elevated will check ultrasound liver  Pt was however taking his antimalaria medicine for his trip out of the country during lab testing and just finished it so I will have him wait about 2 weeks to get next las done

## 2020-01-23 ENCOUNTER — APPOINTMENT (OUTPATIENT)
Dept: LAB | Facility: CLINIC | Age: 25
End: 2020-01-23
Payer: COMMERCIAL

## 2020-01-23 DIAGNOSIS — R74.8 ELEVATED ALKALINE PHOSPHATASE LEVEL: ICD-10-CM

## 2020-01-23 DIAGNOSIS — R74.8 ELEVATED SERUM GGT LEVEL: ICD-10-CM

## 2020-01-23 LAB
ALBUMIN SERPL BCP-MCNC: 4.1 G/DL (ref 3.5–5)
ALP SERPL-CCNC: 99 U/L (ref 46–116)
ALT SERPL W P-5'-P-CCNC: 51 U/L (ref 12–78)
AST SERPL W P-5'-P-CCNC: 27 U/L (ref 5–45)
BASOPHILS # BLD AUTO: 0.06 THOUSANDS/ΜL (ref 0–0.1)
BASOPHILS NFR BLD AUTO: 1 % (ref 0–1)
BILIRUB DIRECT SERPL-MCNC: 0.14 MG/DL (ref 0–0.2)
BILIRUB SERPL-MCNC: 0.51 MG/DL (ref 0.2–1)
EOSINOPHIL # BLD AUTO: 0.43 THOUSAND/ΜL (ref 0–0.61)
EOSINOPHIL NFR BLD AUTO: 8 % (ref 0–6)
ERYTHROCYTE [DISTWIDTH] IN BLOOD BY AUTOMATED COUNT: 11.6 % (ref 11.6–15.1)
HCT VFR BLD AUTO: 48 % (ref 36.5–49.3)
HGB BLD-MCNC: 16.6 G/DL (ref 12–17)
IMM GRANULOCYTES # BLD AUTO: 0.01 THOUSAND/UL (ref 0–0.2)
IMM GRANULOCYTES NFR BLD AUTO: 0 % (ref 0–2)
LYMPHOCYTES # BLD AUTO: 2.31 THOUSANDS/ΜL (ref 0.6–4.47)
LYMPHOCYTES NFR BLD AUTO: 45 % (ref 14–44)
MCH RBC QN AUTO: 28.2 PG (ref 26.8–34.3)
MCHC RBC AUTO-ENTMCNC: 34.6 G/DL (ref 31.4–37.4)
MCV RBC AUTO: 82 FL (ref 82–98)
MONOCYTES # BLD AUTO: 0.31 THOUSAND/ΜL (ref 0.17–1.22)
MONOCYTES NFR BLD AUTO: 6 % (ref 4–12)
NEUTROPHILS # BLD AUTO: 2.05 THOUSANDS/ΜL (ref 1.85–7.62)
NEUTS SEG NFR BLD AUTO: 40 % (ref 43–75)
NRBC BLD AUTO-RTO: 0 /100 WBCS
PLATELET # BLD AUTO: 234 THOUSANDS/UL (ref 149–390)
PMV BLD AUTO: 9.3 FL (ref 8.9–12.7)
PROT SERPL-MCNC: 8.4 G/DL (ref 6.4–8.2)
RBC # BLD AUTO: 5.88 MILLION/UL (ref 3.88–5.62)
WBC # BLD AUTO: 5.17 THOUSAND/UL (ref 4.31–10.16)

## 2020-01-23 PROCEDURE — 85025 COMPLETE CBC W/AUTO DIFF WBC: CPT | Performed by: PHYSICIAN ASSISTANT

## 2020-01-23 PROCEDURE — 84080 ASSAY ALKALINE PHOSPHATASES: CPT

## 2020-01-23 PROCEDURE — 86705 HEP B CORE ANTIBODY IGM: CPT

## 2020-01-23 PROCEDURE — 84075 ASSAY ALKALINE PHOSPHATASE: CPT

## 2020-01-23 PROCEDURE — 80076 HEPATIC FUNCTION PANEL: CPT

## 2020-01-23 PROCEDURE — 36415 COLL VENOUS BLD VENIPUNCTURE: CPT | Performed by: PHYSICIAN ASSISTANT

## 2020-01-23 PROCEDURE — 86704 HEP B CORE ANTIBODY TOTAL: CPT

## 2020-01-23 PROCEDURE — 86803 HEPATITIS C AB TEST: CPT

## 2020-01-23 PROCEDURE — 87340 HEPATITIS B SURFACE AG IA: CPT

## 2020-01-24 LAB
HBV CORE AB SER QL: REACTIVE
HBV CORE IGM SER QL: ABNORMAL
HBV SURFACE AG SER QL: ABNORMAL
HCV AB SER QL: ABNORMAL

## 2020-01-25 LAB
ALP BONE CFR SERPL: 20 % (ref 12–68)
ALP INTEST CFR SERPL: 5 % (ref 0–18)
ALP LIVER CFR SERPL: 75 % (ref 13–88)
ALP SERPL-CCNC: 98 IU/L (ref 39–117)

## 2020-01-28 DIAGNOSIS — R76.8 HEPATITIS B CORE ANTIBODY POSITIVE: Primary | ICD-10-CM

## 2020-02-03 DIAGNOSIS — N52.8 OTHER MALE ERECTILE DYSFUNCTION: ICD-10-CM

## 2020-02-04 RX ORDER — SILDENAFIL 25 MG/1
25 TABLET, FILM COATED ORAL DAILY PRN
Qty: 10 TABLET | Refills: 0 | Status: SHIPPED | OUTPATIENT
Start: 2020-02-04 | End: 2020-03-23 | Stop reason: SDUPTHER

## 2020-03-17 ENCOUNTER — CONSULT (OUTPATIENT)
Dept: GASTROENTEROLOGY | Facility: CLINIC | Age: 25
End: 2020-03-17
Payer: COMMERCIAL

## 2020-03-17 VITALS
HEART RATE: 89 BPM | HEIGHT: 71 IN | SYSTOLIC BLOOD PRESSURE: 128 MMHG | BODY MASS INDEX: 24.14 KG/M2 | WEIGHT: 172.4 LBS | DIASTOLIC BLOOD PRESSURE: 78 MMHG | TEMPERATURE: 98.4 F

## 2020-03-17 DIAGNOSIS — R74.8 ELEVATED ALKALINE PHOSPHATASE LEVEL: Primary | ICD-10-CM

## 2020-03-17 DIAGNOSIS — R76.8 HEPATITIS B CORE ANTIBODY POSITIVE: ICD-10-CM

## 2020-03-17 PROCEDURE — 99244 OFF/OP CNSLTJ NEW/EST MOD 40: CPT | Performed by: INTERNAL MEDICINE

## 2020-03-17 NOTE — PROGRESS NOTES
[facility] - Outpatient Consultation  Sherri Kim 30 y o  male MRN: [id number]  Encounter: [id number]          ASSESSMENT AND PLAN:  Ms Odin Mariscal was seen today for hepatitis B      1  Hepatitis B core antibody positive: His hepatitis B core total antibody was reactive 1/23/2020; hepatitis B surface antigen and hepatitis B C IgM non-reactive  I explained that he is a hepatitis B carrier, and that this test will remain positive and preclude him from donating blood, but he does not have active infection  There is no risk of sexual transmission  He was vaccinated for hepatitis A in 2013 and hepatitis B in [id number]  We will confirm immunity to both hepatitis A and B, and I will check his hepatitis B viral load  I advised that chemotherapy and immunotherapy can cause reactivation, so he will consult with his physician should either become necessary  2 Elevated Alkaline phosphatase - Resolved  Follow-up as needed  ______________________________________________________________________    HPI:  Sherri Kim is a 30 y o  male for evaluation and management of hepatitis B  His hepatitis B core total antibody was reactive 1/23/2020  He had abnormal alkaline phophatase for life insurance screening while on anti-malarial medication in preparation for a trip to [place], but this has returned to normal on repeat screening  He was born in [place]  He is feeling well today and denies any gastrointestinal symptoms  He is accompanied by his adoptive mother today  REVIEW OF SYSTEMS:    CONSTITUTIONAL: Denies any fever, chills, rigors, and weight loss  HEENT: No earache or tinnitus  Denies hearing loss or visual disturbances  CARDIOVASCULAR: No chest pain or palpitations  RESPIRATORY: Denies any cough, hemoptysis, shortness of breath or dyspnea on exertion  GASTROINTESTINAL: As noted in the History of Present Illness  GENITOURINARY: No problems with urination   Denies any hematuria or dysuria  NEUROLOGIC: No dizziness or vertigo, denies headaches  MUSCULOSKELETAL: Denies any muscle or joint pain  SKIN: Denies skin rashes or itching  ENDOCRINE: Denies excessive thirst  Denies intolerance to heat or cold  PSYCHOSOCIAL: Denies depression or anxiety  Denies any recent memory loss  Historical Information   History reviewed  No pertinent past medical history  History reviewed  No pertinent surgical history  Social History   Social History     Substance and Sexual Activity   Alcohol Use No     Social History     Substance and Sexual Activity   Drug Use No     Social History     Tobacco Use   Smoking Status Never Smoker   Smokeless Tobacco Never Used     Family History   Problem Relation Age of Onset    No Known Problems Mother     No Known Problems Father        Meds/Allergies       Current Outpatient Medications:     aspirin-acetaminophen-caffeine (EXCEDRIN MIGRAINE) 250-250-65 MG per tablet    mefloquine (LARIAM) 250 MG tablet    sildenafil (VIAGRA) 25 MG tablet    No Known Allergies        Objective     Blood pressure 128/78, pulse 89, temperature 98 4 °F (36 9 °C), temperature source Tympanic, height 5' 11" (1 803 m), weight 78 2 kg (172 lb 6 4 oz)  Body mass index is 24 04 kg/m²  PHYSICAL EXAM:      General Appearance:   Alert, cooperative, no distress   HEENT:   Normocephalic, atraumatic, anicteric      Neck:  Supple, symmetrical, trachea midline   Lungs:   Clear to auscultation bilaterally; no rales, rhonchi or wheezing; respirations unlabored    Heart[de-identified]   Regular rate and rhythm; no murmur, rub, or gallop     Abdomen:   Soft, non-tender, non-distended; normal bowel sounds; no masses, no organomegaly    Genitalia:   Deferred    Rectal:   Deferred    Extremities:  No cyanosis, clubbing or edema    Pulses:  2+ and symmetric    Skin:  No jaundice, rashes, or lesions    Lymph nodes:  No palpable cervical lymphadenopathy        Lab Results:   No visits with results within 1 Day(s) from this visit  Latest known visit with results is:   Appointment on 01/23/2020   Component Date Value    Hepatitis B Surface Ag 01/23/2020 Non-reactive     Hepatitis C Ab 01/23/2020 Non-reactive     Hep B C IgM 01/23/2020 Non-reactive     Hep B Core Total Ab 01/23/2020 Reactive*    Total Bilirubin 01/23/2020 0 51     Bilirubin, Direct 01/23/2020 0 14     Alkaline Phosphatase 01/23/2020 99     AST 01/23/2020 27     ALT 01/23/2020 51     Total Protein 01/23/2020 8 4*    Albumin 01/23/2020 4 1     Alk Phos Isoenzymes 01/23/2020 98     Alk Phos Liver Fract 01/23/2020 75     Alk Phos Bone Fract 01/23/2020 20     Alk Phos Intestine Fract 01/23/2020 5          Radiology Results:   No results found  Attestation:   By signing my name below, Nia Alvarez, attest that this documentation has been prepared under the direction and in the presence of ADA Gordon  Electronically Signed: Carlos Yoon  3/17/2020        I, Mckenzie Yadav, personally performed the services described in this documentation  All medical record entries made by the [email or web address] were at my direction and in my presence  I have reviewed the chart and discharge instructions and agree that the record reflects my personal performance and is accurate and complete  ADA Gordon  3/17/2020

## 2020-03-23 DIAGNOSIS — N52.8 OTHER MALE ERECTILE DYSFUNCTION: ICD-10-CM

## 2020-03-24 RX ORDER — SILDENAFIL 25 MG/1
25 TABLET, FILM COATED ORAL DAILY PRN
Qty: 10 TABLET | Refills: 0 | Status: SHIPPED | OUTPATIENT
Start: 2020-03-24 | End: 2020-05-07 | Stop reason: SDUPTHER

## 2020-05-07 DIAGNOSIS — N52.8 OTHER MALE ERECTILE DYSFUNCTION: ICD-10-CM

## 2020-05-08 RX ORDER — SILDENAFIL 25 MG/1
25 TABLET, FILM COATED ORAL DAILY PRN
Qty: 10 TABLET | Refills: 0 | Status: SHIPPED | OUTPATIENT
Start: 2020-05-08 | End: 2021-02-19 | Stop reason: ALTCHOICE

## 2020-06-30 ENCOUNTER — OFFICE VISIT (OUTPATIENT)
Dept: FAMILY MEDICINE CLINIC | Facility: CLINIC | Age: 25
End: 2020-06-30
Payer: COMMERCIAL

## 2020-06-30 VITALS
DIASTOLIC BLOOD PRESSURE: 72 MMHG | TEMPERATURE: 97.9 F | HEIGHT: 71 IN | BODY MASS INDEX: 23.94 KG/M2 | HEART RATE: 68 BPM | SYSTOLIC BLOOD PRESSURE: 112 MMHG | WEIGHT: 171 LBS

## 2020-06-30 DIAGNOSIS — B37.0 THRUSH, ORAL: Primary | ICD-10-CM

## 2020-06-30 PROCEDURE — 3008F BODY MASS INDEX DOCD: CPT | Performed by: PHYSICIAN ASSISTANT

## 2020-06-30 PROCEDURE — 1036F TOBACCO NON-USER: CPT | Performed by: PHYSICIAN ASSISTANT

## 2020-06-30 PROCEDURE — 99214 OFFICE O/P EST MOD 30 MIN: CPT | Performed by: PHYSICIAN ASSISTANT

## 2020-06-30 RX ORDER — CLOTRIMAZOLE 10 MG/1
10 LOZENGE ORAL; TOPICAL 4 TIMES DAILY
Qty: 40 TABLET | Refills: 0 | Status: SHIPPED | OUTPATIENT
Start: 2020-06-30 | End: 2020-09-22 | Stop reason: SDUPTHER

## 2020-07-30 LAB
HAV AB SER QL IA: REACTIVE
HBV DNA SERPL NAA+PROBE-ACNC: <10 IU/ML
HBV DNA SERPL NAA+PROBE-LOG IU: <1 LOG IU/ML
HBV SURFACE AB SER QL IA: REACTIVE

## 2020-08-03 ENCOUNTER — TELEPHONE (OUTPATIENT)
Dept: GASTROENTEROLOGY | Facility: CLINIC | Age: 25
End: 2020-08-03

## 2020-08-03 NOTE — TELEPHONE ENCOUNTER
----- Message from Satya Mcclain PA-C sent at 7/31/2020  1:53 PM EDT -----  Please let patient know he has immunity to hepatitis A and B  Also, there is NO detectable hepatitis B virus in his blood stream, good news

## 2020-09-22 DIAGNOSIS — B37.0 THRUSH, ORAL: ICD-10-CM

## 2020-09-22 DIAGNOSIS — Z11.4 SCREENING FOR HIV (HUMAN IMMUNODEFICIENCY VIRUS): Primary | ICD-10-CM

## 2020-09-22 DIAGNOSIS — B37.0 THRUSH: ICD-10-CM

## 2020-09-22 RX ORDER — CLOTRIMAZOLE 10 MG/1
10 LOZENGE ORAL; TOPICAL 4 TIMES DAILY
Qty: 40 TABLET | Refills: 0 | Status: SHIPPED | OUTPATIENT
Start: 2020-09-22 | End: 2021-02-19 | Stop reason: ALTCHOICE

## 2020-09-24 LAB — HIV 1+2 AB+HIV1 P24 AG SERPL QL IA: NORMAL

## 2020-09-30 DIAGNOSIS — B37.0 THRUSH, ORAL: Primary | ICD-10-CM

## 2020-09-30 NOTE — RESULT ENCOUNTER NOTE
Please let pt know his HIV was negative  I am recommending him to see ENT if he develops thrush again  Order is in his chart  He should have this info on hand and call them if needed  Thank you

## 2020-10-12 ENCOUNTER — OFFICE VISIT (OUTPATIENT)
Dept: FAMILY MEDICINE CLINIC | Facility: CLINIC | Age: 25
End: 2020-10-12
Payer: COMMERCIAL

## 2020-10-12 VITALS
HEART RATE: 60 BPM | TEMPERATURE: 98 F | SYSTOLIC BLOOD PRESSURE: 110 MMHG | HEIGHT: 71 IN | BODY MASS INDEX: 22.82 KG/M2 | WEIGHT: 163 LBS | DIASTOLIC BLOOD PRESSURE: 76 MMHG

## 2020-10-12 DIAGNOSIS — B37.0 THRUSH, ORAL: ICD-10-CM

## 2020-10-12 DIAGNOSIS — Z00.00 ENCOUNTER FOR PHYSICAL EXAMINATION: Primary | ICD-10-CM

## 2020-10-12 PROCEDURE — 3725F SCREEN DEPRESSION PERFORMED: CPT | Performed by: PHYSICIAN ASSISTANT

## 2020-10-12 PROCEDURE — 1036F TOBACCO NON-USER: CPT | Performed by: PHYSICIAN ASSISTANT

## 2020-10-12 PROCEDURE — 99395 PREV VISIT EST AGE 18-39: CPT | Performed by: PHYSICIAN ASSISTANT

## 2021-02-19 ENCOUNTER — OFFICE VISIT (OUTPATIENT)
Dept: FAMILY MEDICINE CLINIC | Facility: CLINIC | Age: 26
End: 2021-02-19
Payer: COMMERCIAL

## 2021-02-19 VITALS
HEIGHT: 71 IN | TEMPERATURE: 98.2 F | BODY MASS INDEX: 23.8 KG/M2 | WEIGHT: 170 LBS | SYSTOLIC BLOOD PRESSURE: 118 MMHG | DIASTOLIC BLOOD PRESSURE: 78 MMHG | HEART RATE: 72 BPM

## 2021-02-19 DIAGNOSIS — Z00.00 ENCOUNTER FOR PHYSICAL EXAMINATION: Primary | ICD-10-CM

## 2021-02-19 PROBLEM — R74.8 ELEVATED ALKALINE PHOSPHATASE LEVEL: Status: RESOLVED | Noted: 2020-01-13 | Resolved: 2021-02-19

## 2021-02-19 PROBLEM — B37.0 THRUSH, ORAL: Status: RESOLVED | Noted: 2020-06-30 | Resolved: 2021-02-19

## 2021-02-19 PROBLEM — N52.8 OTHER MALE ERECTILE DYSFUNCTION: Status: RESOLVED | Noted: 2019-11-07 | Resolved: 2021-02-19

## 2021-02-19 PROBLEM — Z02.89 ENCOUNTER FOR COMPLETION OF FORM WITH PATIENT: Status: RESOLVED | Noted: 2018-05-15 | Resolved: 2021-02-19

## 2021-02-19 PROBLEM — Z29.8 NEED FOR MALARIA PROPHYLAXIS: Status: RESOLVED | Noted: 2019-11-07 | Resolved: 2021-02-19

## 2021-02-19 PROCEDURE — 1036F TOBACCO NON-USER: CPT | Performed by: PHYSICIAN ASSISTANT

## 2021-02-19 PROCEDURE — 99395 PREV VISIT EST AGE 18-39: CPT | Performed by: PHYSICIAN ASSISTANT

## 2021-02-19 PROCEDURE — 3008F BODY MASS INDEX DOCD: CPT | Performed by: PHYSICIAN ASSISTANT

## 2021-02-19 NOTE — PROGRESS NOTES
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  Patient is technically up-to-date with immunizations for age  I do not believe PPD is needed today as he has not changed jobs at the  that he is currently on  Diagnoses and all orders for this visit:    Encounter for physical examination              Subjective:      Patient ID: Blanca Herron is a 30 y o  male  CC:    Chief Complaint   Patient presents with    Physical Exam     employment    mgb       HPI:      Patient here today for an appointment physical for  job and then also for a camp physical for the summer that he spends about 2 hours away 2 months worth  He has no complaints today  He does need to get the dentist   Vaccines up-to-date on a delayed schedule as he was brought over to the [facility] from [place]  The following portions of the patient's history were reviewed and updated as appropriate: allergies, current medications, past family history, past medical history, past social history, past surgical history and problem list       Review of Systems   Constitutional: Negative  HENT: Negative  Eyes: Negative  Respiratory: Negative  Cardiovascular: Negative  Gastrointestinal: Negative  Endocrine: Negative  Genitourinary: Negative  Musculoskeletal: Negative  Skin: Negative  Allergic/Immunologic: Negative  Neurological: Negative  Hematological: Negative  Psychiatric/Behavioral: Negative            Data to review:       Objective:    Vitals:    02/19/21 1438   BP: 118/78   BP Location: Left arm   Patient Position: Sitting   Cuff Size: Standard   Pulse: 72   Temp: 98 2 °F (36 8 °C)   TempSrc: Temporal   Weight: 77 1 kg (170 lb)   Height: 5' 11" (1 803 m)        Physical Exam  Vitals signs and nursing note reviewed  Constitutional:       General: He is not in acute distress  Appearance: Normal appearance  He is well-developed  He is not ill-appearing  HENT:      Head: Normocephalic and atraumatic  Right Ear: Hearing, tympanic membrane, ear canal and external ear normal       Left Ear: Hearing, tympanic membrane, ear canal and external ear normal       Nose: Nose normal       Mouth/Throat:      Mouth: Mucous membranes are moist       Pharynx: Oropharynx is clear  Eyes:      General: Lids are normal          Right eye: No discharge  Left eye: No discharge  Extraocular Movements: Extraocular movements intact  Conjunctiva/sclera: Conjunctivae normal       Pupils: Pupils are equal, round, and reactive to light  Neck:      Musculoskeletal: Neck supple  Cardiovascular:      Rate and Rhythm: Normal rate and regular rhythm  Heart sounds: Normal heart sounds  Pulmonary:      Effort: Pulmonary effort is normal  No respiratory distress  Breath sounds: Normal breath sounds  Abdominal:      General: Bowel sounds are normal       Palpations: Abdomen is soft  Tenderness: There is no abdominal tenderness  Lymphadenopathy:      Cervical: No cervical adenopathy  Skin:     General: Skin is warm and dry  Neurological:      General: No focal deficit present  Mental Status: He is alert  Motor: Motor function is intact  Coordination: Coordination is intact  Deep Tendon Reflexes: Reflexes are normal and symmetric  Psychiatric:         Mood and Affect: Mood normal          Behavior: Behavior normal  Behavior is cooperative  Thought Content:  Thought content normal          Judgment: Judgment normal

## 2021-02-19 NOTE — ASSESSMENT & PLAN NOTE
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

## 2021-03-18 ENCOUNTER — TELEPHONE (OUTPATIENT)
Dept: OTHER | Facility: OTHER | Age: 26
End: 2021-03-18

## 2021-03-19 NOTE — TELEPHONE ENCOUNTER
Per the pts request his appt for tomorrow 3/19 was cancelled  He no longer needs to get tested for COVID, he was not exposed

## 2021-04-01 DIAGNOSIS — Z23 ENCOUNTER FOR IMMUNIZATION: ICD-10-CM

## 2021-04-12 ENCOUNTER — IMMUNIZATIONS (OUTPATIENT)
Dept: FAMILY MEDICINE CLINIC | Facility: HOSPITAL | Age: 26
End: 2021-04-12

## 2021-04-12 DIAGNOSIS — Z23 ENCOUNTER FOR IMMUNIZATION: Primary | ICD-10-CM

## 2021-04-12 PROCEDURE — 91301 SARS-COV-2 / COVID-19 MRNA VACCINE (MODERNA) 100 MCG: CPT

## 2021-04-12 PROCEDURE — 0011A SARS-COV-2 / COVID-19 MRNA VACCINE (MODERNA) 100 MCG: CPT

## 2021-04-30 ENCOUNTER — OFFICE VISIT (OUTPATIENT)
Dept: FAMILY MEDICINE CLINIC | Facility: CLINIC | Age: 26
End: 2021-04-30
Payer: COMMERCIAL

## 2021-04-30 VITALS
WEIGHT: 171 LBS | SYSTOLIC BLOOD PRESSURE: 110 MMHG | HEART RATE: 56 BPM | DIASTOLIC BLOOD PRESSURE: 76 MMHG | BODY MASS INDEX: 23.94 KG/M2 | TEMPERATURE: 98.5 F | HEIGHT: 71 IN

## 2021-04-30 DIAGNOSIS — J35.8 TONSILLITH: Primary | ICD-10-CM

## 2021-04-30 PROCEDURE — 3008F BODY MASS INDEX DOCD: CPT | Performed by: PHYSICIAN ASSISTANT

## 2021-04-30 PROCEDURE — 99213 OFFICE O/P EST LOW 20 MIN: CPT | Performed by: PHYSICIAN ASSISTANT

## 2021-04-30 PROCEDURE — 1036F TOBACCO NON-USER: CPT | Performed by: PHYSICIAN ASSISTANT

## 2021-04-30 NOTE — PROGRESS NOTES
Assessment and Plan:    Problem List Items Addressed This Visit        Digestive    Tonsillith - Primary       Explained diagnosis that these are benign collection of debris from what we eat and drink that collect in our notes and creatinine is of our tonsils  Patient currently without any  He can prevent them from forming by gargling after each meal                       Diagnoses and all orders for this visit:    Tonsillith              Subjective:      Patient ID: Noé Guan is a 30 y o  male  CC:    Chief Complaint   Patient presents with    Other     Pt has possible tonsil stone right side  He noticed it one week ago -  [facility]       HPI:     Patient states this morning he saw that he had a white stone in his right tonsil  It did not hurt him he did see as he was brushing his teeth and he is here today to get advice on how to remove it  He did have 1 in the past but he poked out with a Q-tip as per internist [employer] instructions  He states that it was difficult to do  Upon examination today his Anne Marie Emily has already fallen out and he has most likely ingested it and he did not even know it  The following portions of the patient's history were reviewed and updated as appropriate: allergies, current medications, past family history, past medical history, past social history, past surgical history and problem list       Review of Systems   Constitutional: Negative  HENT: Negative  Possible tonsil stones   Eyes: Negative  Respiratory: Negative  Cardiovascular: Negative  Gastrointestinal: Negative  Endocrine: Negative  Genitourinary: Negative  Musculoskeletal: Negative  Skin: Negative  Allergic/Immunologic: Negative  Neurological: Negative  Hematological: Negative  Psychiatric/Behavioral: Negative            Data to review:       Objective:    Vitals:    04/30/21 1154   BP: 110/76   BP Location: Left arm   Patient Position: Sitting   Cuff Size: Large   Pulse: 56 Temp: 98 5 °F (36 9 °C)   TempSrc: Oral   Weight: 77 6 kg (171 lb)   Height: 5' 11" (1 803 m)        Physical Exam  Vitals signs and nursing note reviewed  Constitutional:       Appearance: Normal appearance  HENT:      Head: Normocephalic and atraumatic  Mouth/Throat:      Comments:   Currently no tonsillar irritation or tonsilliths were identified  I did get a mirror for the patient to show me what his concern was and he stated that it was no longer there from what he saw this morning  No pus no erythema and no edema no tenderness no cervical lymphadenopathy  Eyes:      General: Lids are normal       Conjunctiva/sclera: Conjunctivae normal       Pupils: Pupils are equal, round, and reactive to light  Cardiovascular:      Rate and Rhythm: Normal rate and regular rhythm  Heart sounds: Normal heart sounds  Pulmonary:      Effort: Pulmonary effort is normal       Breath sounds: Normal breath sounds  Lymphadenopathy:      Cervical: No cervical adenopathy  Skin:     General: Skin is warm and dry  Neurological:      General: No focal deficit present  Mental Status: He is alert  Mental status is at baseline  Psychiatric:         Mood and Affect: Mood normal          Behavior: Behavior normal          Thought Content:  Thought content normal          Judgment: Judgment normal

## 2021-04-30 NOTE — ASSESSMENT & PLAN NOTE
Explained diagnosis that these are benign collection of debris from what we eat and drink that collect in our notes and creatinine is of our tonsils  Patient currently without any    He can prevent them from forming by gargling after each meal

## 2021-05-12 ENCOUNTER — IMMUNIZATIONS (OUTPATIENT)
Dept: FAMILY MEDICINE CLINIC | Facility: HOSPITAL | Age: 26
End: 2021-05-12

## 2021-05-12 DIAGNOSIS — Z23 ENCOUNTER FOR IMMUNIZATION: Primary | ICD-10-CM

## 2021-05-12 PROCEDURE — 0012A SARS-COV-2 / COVID-19 MRNA VACCINE (MODERNA) 100 MCG: CPT

## 2021-05-12 PROCEDURE — 91301 SARS-COV-2 / COVID-19 MRNA VACCINE (MODERNA) 100 MCG: CPT

## 2021-05-28 DIAGNOSIS — N52.8 OTHER MALE ERECTILE DYSFUNCTION: ICD-10-CM

## 2021-05-28 DIAGNOSIS — Z29.8 NEED FOR MALARIA PROPHYLAXIS: ICD-10-CM

## 2021-05-28 RX ORDER — SILDENAFIL 25 MG/1
25 TABLET, FILM COATED ORAL DAILY PRN
Qty: 10 TABLET | Refills: 0 | Status: SHIPPED | OUTPATIENT
Start: 2021-05-28 | End: 2021-06-12 | Stop reason: SDUPTHER

## 2021-05-28 RX ORDER — SILDENAFIL 25 MG/1
25 TABLET, FILM COATED ORAL DAILY PRN
Qty: 10 TABLET | Refills: 0 | Status: CANCELLED | OUTPATIENT
Start: 2021-05-28

## 2021-05-28 RX ORDER — MEFLOQUINE HYDROCHLORIDE 250 MG/1
250 TABLET ORAL
Qty: 20 TABLET | Refills: 0 | Status: SHIPPED | OUTPATIENT
Start: 2021-05-28 | End: 2022-01-20 | Stop reason: ALTCHOICE

## 2021-05-28 RX ORDER — SILDENAFIL 25 MG/1
25 TABLET, FILM COATED ORAL DAILY PRN
Qty: 10 TABLET | Refills: 0 | Status: SHIPPED | OUTPATIENT
Start: 2021-05-28 | End: 2021-05-28 | Stop reason: SDUPTHER

## 2021-05-31 NOTE — TELEPHONE ENCOUNTER
Pt had a EKG done at a Urgent care center in [place]  He wants to see DM before seeing Cardio   He will bring paperwork from  with him to [place] Spoke with pt, we sent the script to his [place] pharmacy for him    Fernanda Dickerson CMA ([facility])

## 2021-06-12 DIAGNOSIS — N52.8 OTHER MALE ERECTILE DYSFUNCTION: ICD-10-CM

## 2021-06-13 DIAGNOSIS — N52.8 OTHER MALE ERECTILE DYSFUNCTION: ICD-10-CM

## 2021-06-14 RX ORDER — SILDENAFIL 25 MG/1
25 TABLET, FILM COATED ORAL DAILY PRN
Qty: 10 TABLET | Refills: 0 | OUTPATIENT
Start: 2021-06-14

## 2021-06-14 RX ORDER — SILDENAFIL 25 MG/1
25 TABLET, FILM COATED ORAL DAILY PRN
Qty: 10 TABLET | Refills: 0 | Status: SHIPPED | OUTPATIENT
Start: 2021-06-14 | End: 2021-08-11 | Stop reason: SDUPTHER

## 2021-08-11 DIAGNOSIS — N52.8 OTHER MALE ERECTILE DYSFUNCTION: ICD-10-CM

## 2021-08-11 RX ORDER — SILDENAFIL 25 MG/1
25 TABLET, FILM COATED ORAL DAILY PRN
Qty: 10 TABLET | Refills: 0 | Status: SHIPPED | OUTPATIENT
Start: 2021-08-11 | End: 2022-01-20 | Stop reason: ALTCHOICE

## 2021-08-18 DIAGNOSIS — N52.8 OTHER MALE ERECTILE DYSFUNCTION: Primary | ICD-10-CM

## 2021-08-23 DIAGNOSIS — Z29.8 NEED FOR MALARIA PROPHYLAXIS: Primary | ICD-10-CM

## 2021-08-23 RX ORDER — ATOVAQUONE AND PROGUANIL HYDROCHLORIDE 250; 100 MG/1; MG/1
1 TABLET, FILM COATED ORAL
Qty: 30 TABLET | Refills: 0 | Status: SHIPPED | OUTPATIENT
Start: 2021-08-23 | End: 2022-01-20 | Stop reason: ALTCHOICE

## 2021-10-07 DIAGNOSIS — J35.8 TONSILLITH: Primary | ICD-10-CM

## 2021-12-15 ENCOUNTER — OCCMED (OUTPATIENT)
Dept: URGENT CARE | Facility: MEDICAL CENTER | Age: 26
End: 2021-12-15

## 2021-12-15 DIAGNOSIS — Z20.822 ENCOUNTER FOR SCREENING LABORATORY TESTING FOR COVID-19 VIRUS: Primary | ICD-10-CM

## 2021-12-15 PROCEDURE — U0003 INFECTIOUS AGENT DETECTION BY NUCLEIC ACID (DNA OR RNA); SEVERE ACUTE RESPIRATORY SYNDROME CORONAVIRUS 2 (SARS-COV-2) (CORONAVIRUS DISEASE [COVID-19]), AMPLIFIED PROBE TECHNIQUE, MAKING USE OF HIGH THROUGHPUT TECHNOLOGIES AS DESCRIBED BY CMS-2020-01-R: HCPCS | Performed by: PHYSICIAN ASSISTANT

## 2021-12-15 PROCEDURE — U0005 INFEC AGEN DETEC AMPLI PROBE: HCPCS | Performed by: PHYSICIAN ASSISTANT

## 2021-12-17 ENCOUNTER — TELEPHONE (OUTPATIENT)
Dept: URGENT CARE | Facility: MEDICAL CENTER | Age: 26
End: 2021-12-17

## 2022-02-15 DIAGNOSIS — N52.8 OTHER MALE ERECTILE DYSFUNCTION: Primary | ICD-10-CM

## 2022-04-08 ENCOUNTER — OFFICE VISIT (OUTPATIENT)
Dept: UROLOGY | Facility: AMBULATORY SURGERY CENTER | Age: 27
End: 2022-04-08
Payer: COMMERCIAL

## 2022-04-08 VITALS
HEIGHT: 71 IN | BODY MASS INDEX: 24.25 KG/M2 | HEART RATE: 59 BPM | WEIGHT: 173.2 LBS | SYSTOLIC BLOOD PRESSURE: 120 MMHG | DIASTOLIC BLOOD PRESSURE: 88 MMHG

## 2022-04-08 DIAGNOSIS — N52.8 OTHER MALE ERECTILE DYSFUNCTION: ICD-10-CM

## 2022-04-08 PROCEDURE — 3008F BODY MASS INDEX DOCD: CPT | Performed by: NURSE PRACTITIONER

## 2022-04-08 PROCEDURE — 1036F TOBACCO NON-USER: CPT | Performed by: NURSE PRACTITIONER

## 2022-04-08 PROCEDURE — 99204 OFFICE O/P NEW MOD 45 MIN: CPT | Performed by: NURSE PRACTITIONER

## 2022-04-08 RX ORDER — SILDENAFIL 25 MG/1
25 TABLET, FILM COATED ORAL AS NEEDED
Qty: 30 TABLET | Refills: 6 | Status: SHIPPED | OUTPATIENT
Start: 2022-04-08 | End: 2022-04-21 | Stop reason: SDUPTHER

## 2022-04-08 NOTE — PROGRESS NOTES
4/8/2022    Mauro Steve  1995  [id number]      Assessment  -Erectile dysfunction    Discussion/Plan  Moshe Kolb is a 27 y o  male who presents in consultation       1  Erectile dysfunction- patient will continue sildenafil 25mg as needed prior to sexual activity  Reviewed mechanism of action, potential side effects, and administration instructions  He states he will be moving to [place], [place] at the end of the month  Patient will call our office with any issues and follow up as needed      -All questions answered, patient agrees with plan      History of Present Illness  27 y o  male who presents in consultation today for evaluation of erectile dysfunction  He was referred by his PCP  Patient reports difficulties maintaining an erection  He still reports spontaneous morning erections  Patient prescribed sildenafil 25mg by PCP, which he states was effective  He denies any alcohol, drug, or tobacco use  Patient denies any prior urologic history, surgical intervention, or instrumentation  He denies any strong family history of prostate malignancy  Review of Systems  Review of Systems   Constitutional: Negative  HENT: Negative  Respiratory: Negative  Cardiovascular: Negative  Gastrointestinal: Negative  Genitourinary: Negative for decreased urine volume, difficulty urinating, dysuria, flank pain, frequency, hematuria, penile discharge, penile pain, penile swelling, scrotal swelling, testicular pain and urgency  Musculoskeletal: Negative  Skin: Negative  Neurological: Negative  Psychiatric/Behavioral: Negative  Past Medical History  No past medical history on file      Past Social History  Past Surgical History:   Procedure Laterality Date    WISDOM TOOTH EXTRACTION         Past Family History  Family History   Adopted: Yes   Problem Relation Age of Onset    No Known Problems Mother     No Known Problems Father        Past Social history  Social History     Socioeconomic History    Marital status: Single     Spouse name: Not on file    Number of children: Not on file    Years of education: Not on file    Highest education level: Not on file   Occupational History    Not on file   Tobacco Use    Smoking status: Never Smoker    Smokeless tobacco: Never Used   Vaping Use    Vaping Use: Never used   Substance and Sexual Activity    Alcohol use: No    Drug use: No    Sexual activity: Not on file   Other Topics Concern    Not on file   Social History Narrative    COUNSELING ABOUT TRAVEL    ADOPTED CHILD    CONSUMES 1-2 CUPS OF COFFEE PER DAY     Social Determinants of Health     Financial Resource Strain: Not on file   Food Insecurity: Not on file   Transportation Needs: Not on file   Physical Activity: Not on file   Stress: Not on file   Social Connections: Not on file   Intimate Partner Violence: Not on file   Housing Stability: Not on file       Current Medications  Current Outpatient Medications   Medication Sig Dispense Refill    predniSONE 20 mg tablet 3 tablets by mouth with food in AM x 3 days, 2 tabs by mouth with food in AM x 3 days then 1 tab with food each morning x 3 days 18 tablet 0    sildenafil (VIAGRA) 25 MG tablet Take 1 tablet (25 mg total) by mouth as needed for erectile dysfunction 30 tablet 6     No current facility-administered medications for this visit  Allergies  No Known Allergies    Past Medical History, Social History, Family History, medications and allergies were reviewed  Vitals  Vitals:    04/08/22 1114   BP: 120/88   Pulse: 59   Weight: 78 6 kg (173 lb 3 2 oz)   Height: 5' 11" (1 803 m)       Physical Exam  Physical Exam  Constitutional:       Appearance: Normal appearance  He is well-developed  HENT:      Head: Normocephalic  Eyes:      Pupils: Pupils are equal, round, and reactive to light  Pulmonary:      Effort: Pulmonary effort is normal    Abdominal:      Palpations: Abdomen is soft     Musculoskeletal:         General: Normal range of motion  Cervical back: Normal range of motion  Skin:     General: Skin is warm and dry  Neurological:      General: No focal deficit present  Mental Status: He is alert and oriented to person, place, and time  Psychiatric:         Mood and Affect: Mood normal          Behavior: Behavior normal          Thought Content: Thought content normal          Judgment: Judgment normal          Results    I have personally reviewed all pertinent lab results and reviewed with patient  No results found for: PSA  Lab Results   Component Value Date    CALCIUM 9 6 06/06/2017    K 3 9 06/06/2017    CO2 30 06/06/2017    CL 99 (L) 06/06/2017    BUN 12 06/06/2017    CREATININE 1 07 06/06/2017     Lab Results   Component Value Date    WBC 5 17 01/23/2020    HGB 16 6 01/23/2020    HCT 48 0 01/23/2020    MCV 82 01/23/2020     01/23/2020     No results found for this or any previous visit (from the past 1 hour(s))

## 2022-04-21 DIAGNOSIS — N52.8 OTHER MALE ERECTILE DYSFUNCTION: ICD-10-CM

## 2022-04-22 RX ORDER — SILDENAFIL 25 MG/1
25 TABLET, FILM COATED ORAL AS NEEDED
Qty: 30 TABLET | Refills: 0 | Status: SHIPPED | OUTPATIENT
Start: 2022-04-22

## 2022-11-07 NOTE — RESULT NOTES
Verified Results  (1) CBC/PLT/DIFF [id number] 02:09PM Suri Bae    Order Number: [id number]     Test Name Result Flag Reference   WBC COUNT 7 42 Thousand/uL  4 31-10 16   RBC COUNT 5 64 Million/uL H 3 88-5 62   HEMOGLOBIN 16 8 g/dL  12 0-17 0   HEMATOCRIT 46 0 %  36 5-49 3   MCV 82 fL  82-98   MCH 29 8 pg  26 8-34 3   MCHC 36 5 g/dL  31 4-37 4   RDW 12 6 %  11 6-15 1   MPV 10 1 fL  8 9-12 7   PLATELET COUNT 335 Thousands/uL  149-390   nRBC AUTOMATED 0 /100 WBCs     NEUTROPHILS RELATIVE PERCENT 35 % L 43-75   LYMPHOCYTES RELATIVE PERCENT 32 %  14-44   MONOCYTES RELATIVE PERCENT 5 %  4-12   EOSINOPHILS RELATIVE PERCENT 27 % H 0-6   BASOPHILS RELATIVE PERCENT 1 %  0-1   NEUTROPHILS ABSOLUTE COUNT 2 60 Thousands/? ??L  1 85-7 62   LYMPHOCYTES ABSOLUTE COUNT 2 35 Thousands/? ??L  0 60-4 47   MONOCYTES ABSOLUTE COUNT 0 40 Thousand/? ??L  0 17-1 22   EOSINOPHILS ABSOLUTE COUNT 2 00 Thousand/? ??L H 0 00-0 61   BASOPHILS ABSOLUTE COUNT 0 05 Thousands/? ??L  0 00-0 10     (1) COMPREHENSIVE METABOLIC PANEL [id number] 65:31RP Suri Bae    Order Number: [id number]     Test Name Result Flag Reference   SODIUM 136 mmol/L  136-145   POTASSIUM 3 9 mmol/L  3 5-5 3   CHLORIDE 99 mmol/L L 100-108   CARBON DIOXIDE 30 mmol/L  21-32   ANION GAP (CALC) 7 mmol/L  4-13   BLOOD UREA NITROGEN 12 mg/dL  5-25   CREATININE 1 07 mg/dL  0 60-1 30   Standardized to IDMS reference method   CALCIUM 9 6 mg/dL  8 3-10 1   BILI, TOTAL 0 67 mg/dL  0 20-1 00   ALK PHOSPHATAS 87 U/L     ALT (SGPT) 28 U/L  12-78   AST(SGOT) 25 U/L  5-45   ALBUMIN 3 9 g/dL  3 5-5 0   TOTAL PROTEIN 8 1 g/dL  6 4-8 2   eGFR Non-African American      >60 0 ml/min/1 73sq [facility] Disease Education Program recommendations are as follows:  GFR calculation is accurate only with a steady state creatinine  Chronic Kidney disease less than 60 ml/min/1 73 sq  meters  Kidney failure less than 15 ml/min/1 73 sq  meters     GLUCOSE FASTING 73 mg/dL  65-99     (1) TSH WITH FT4 REFLEX [id number] 02:09PM [facility] Order Number: [id number]     Test Name Result Flag Reference   TSH 0 827 uIU/mL  0 358-3 740   Patients undergoing fluorescein dye angiography may retain small amounts of fluorescein in the body for 48-72 hours post procedure  Samples containing fluorescein can produce falsely depressed TSH values  If the patient had this procedure,a specimen should be resubmitted post fluorescein clearance  Rifampin Pregnancy And Lactation Text: This medication is Pregnancy Category C and it isn't know if it is safe during pregnancy. It is also excreted in breast milk and should not be used if you are breast feeding.